# Patient Record
Sex: MALE | Race: OTHER | HISPANIC OR LATINO | URBAN - METROPOLITAN AREA
[De-identification: names, ages, dates, MRNs, and addresses within clinical notes are randomized per-mention and may not be internally consistent; named-entity substitution may affect disease eponyms.]

---

## 2018-05-04 ENCOUNTER — EMERGENCY (EMERGENCY)
Facility: HOSPITAL | Age: 38
LOS: 1 days | Discharge: HOME | End: 2018-05-04
Attending: EMERGENCY MEDICINE
Payer: MEDICAID

## 2018-05-04 VITALS
TEMPERATURE: 98 F | SYSTOLIC BLOOD PRESSURE: 132 MMHG | WEIGHT: 184.97 LBS | HEIGHT: 65 IN | RESPIRATION RATE: 18 BRPM | HEART RATE: 85 BPM | OXYGEN SATURATION: 99 % | DIASTOLIC BLOOD PRESSURE: 82 MMHG

## 2018-05-04 VITALS
RESPIRATION RATE: 19 BRPM | OXYGEN SATURATION: 98 % | DIASTOLIC BLOOD PRESSURE: 76 MMHG | SYSTOLIC BLOOD PRESSURE: 120 MMHG | TEMPERATURE: 98 F | HEART RATE: 79 BPM

## 2018-05-04 PROCEDURE — 71046 X-RAY EXAM CHEST 2 VIEWS: CPT | Mod: 26

## 2018-05-04 PROCEDURE — 93005 ELECTROCARDIOGRAM TRACING: CPT

## 2018-05-04 PROCEDURE — 99283 EMERGENCY DEPT VISIT LOW MDM: CPT | Mod: 25

## 2018-05-04 PROCEDURE — 99053 MED SERV 10PM-8AM 24 HR FAC: CPT

## 2018-05-04 PROCEDURE — 71046 X-RAY EXAM CHEST 2 VIEWS: CPT

## 2018-05-04 PROCEDURE — 93010 ELECTROCARDIOGRAM REPORT: CPT

## 2018-05-04 PROCEDURE — 99283 EMERGENCY DEPT VISIT LOW MDM: CPT

## 2018-05-04 RX ORDER — IBUPROFEN 200 MG
600 TABLET ORAL ONCE
Qty: 0 | Refills: 0 | Status: COMPLETED | OUTPATIENT
Start: 2018-05-04 | End: 2018-05-04

## 2018-05-04 RX ADMIN — Medication 600 MILLIGRAM(S): at 03:01
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
## 2018-05-05 NOTE — ED PROVIDER NOTE - PHYSICAL EXAMINATION
General: well appearing, in no acute distressed, alert and oriented to person, place and time  HEENT: Head: atraumatic Eyes: pupils round and equal, reactive to light, extraocular ROM intact, Ears: atraumatic, Neck: supple and atraumatic Throat: patent, no swelling, no blood, uvula midline  Cardiac: regular rate and rhythm, S1 and S2 heard, no murmurs, distal pulses intact  Respiratory: breathing is even and unlabored, lung sounds clear to auscultation bilaterally, no wheezes, rales or rhonchi   Gastrointestinal: abdomen soft, non-tender, bowel sounds present  Musculoskeletal: no deformities, + full ROM of all joints, + anterior chest wall tenderness, no tenderness of cervical spine, no midline back tenderness, no tenderness of hips, pelvis or extremities, patient is ambulating without difficulty  Neurological: cranial nerves II through XII intact, no focal weaknesses, 5/5 motor strength in all extremities, sensation intact throughout  Skin: intact; no lacerations, abrasions, swelling, hematomas, or ecchymosis; no jo sign, negative seatbelt sign, capillary refill < 2 sec in all digits  Psychiatric; no suicidal or homicidal ideation
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
## 2020-04-04 ENCOUNTER — EMERGENCY (EMERGENCY)
Facility: HOSPITAL | Age: 40
LOS: 1 days | Discharge: HOME | End: 2020-04-04
Attending: EMERGENCY MEDICINE
Payer: MEDICAID

## 2020-04-04 VITALS
SYSTOLIC BLOOD PRESSURE: 115 MMHG | WEIGHT: 179.9 LBS | HEART RATE: 104 BPM | OXYGEN SATURATION: 98 % | DIASTOLIC BLOOD PRESSURE: 65 MMHG | HEIGHT: 65 IN | RESPIRATION RATE: 26 BRPM | TEMPERATURE: 103 F

## 2020-04-04 LAB
ALBUMIN SERPL ELPH-MCNC: 3.9 G/DL — SIGNIFICANT CHANGE UP (ref 3.3–5.2)
ALP SERPL-CCNC: 79 U/L — SIGNIFICANT CHANGE UP (ref 40–120)
ALT FLD-CCNC: 36 U/L — SIGNIFICANT CHANGE UP
ANION GAP SERPL CALC-SCNC: 18 MMOL/L — HIGH (ref 5–17)
AST SERPL-CCNC: 57 U/L — HIGH
BASOPHILS # BLD AUTO: 0.01 K/UL — SIGNIFICANT CHANGE UP (ref 0–0.2)
BASOPHILS NFR BLD AUTO: 0.1 % — SIGNIFICANT CHANGE UP (ref 0–2)
BILIRUB SERPL-MCNC: 0.4 MG/DL — SIGNIFICANT CHANGE UP (ref 0.4–2)
BUN SERPL-MCNC: 9 MG/DL — SIGNIFICANT CHANGE UP (ref 8–20)
CALCIUM SERPL-MCNC: 8.8 MG/DL — SIGNIFICANT CHANGE UP (ref 8.6–10.2)
CHLORIDE SERPL-SCNC: 101 MMOL/L — SIGNIFICANT CHANGE UP (ref 98–107)
CO2 SERPL-SCNC: 20 MMOL/L — LOW (ref 22–29)
CREAT SERPL-MCNC: 0.98 MG/DL — SIGNIFICANT CHANGE UP (ref 0.5–1.3)
EOSINOPHIL # BLD AUTO: 0.01 K/UL — SIGNIFICANT CHANGE UP (ref 0–0.5)
EOSINOPHIL NFR BLD AUTO: 0.1 % — SIGNIFICANT CHANGE UP (ref 0–6)
GLUCOSE SERPL-MCNC: 109 MG/DL — HIGH (ref 70–99)
HCT VFR BLD CALC: 42.2 % — SIGNIFICANT CHANGE UP (ref 39–50)
HGB BLD-MCNC: 14.2 G/DL — SIGNIFICANT CHANGE UP (ref 13–17)
IMM GRANULOCYTES NFR BLD AUTO: 0.4 % — SIGNIFICANT CHANGE UP (ref 0–1.5)
LYMPHOCYTES # BLD AUTO: 1.26 K/UL — SIGNIFICANT CHANGE UP (ref 1–3.3)
LYMPHOCYTES # BLD AUTO: 13 % — SIGNIFICANT CHANGE UP (ref 13–44)
MCHC RBC-ENTMCNC: 30.4 PG — SIGNIFICANT CHANGE UP (ref 27–34)
MCHC RBC-ENTMCNC: 33.6 GM/DL — SIGNIFICANT CHANGE UP (ref 32–36)
MCV RBC AUTO: 90.4 FL — SIGNIFICANT CHANGE UP (ref 80–100)
MONOCYTES # BLD AUTO: 0.3 K/UL — SIGNIFICANT CHANGE UP (ref 0–0.9)
MONOCYTES NFR BLD AUTO: 3.1 % — SIGNIFICANT CHANGE UP (ref 2–14)
NEUTROPHILS # BLD AUTO: 8.04 K/UL — HIGH (ref 1.8–7.4)
NEUTROPHILS NFR BLD AUTO: 83.3 % — HIGH (ref 43–77)
PLATELET # BLD AUTO: 298 K/UL — SIGNIFICANT CHANGE UP (ref 150–400)
POTASSIUM SERPL-MCNC: 3.7 MMOL/L — SIGNIFICANT CHANGE UP (ref 3.5–5.3)
POTASSIUM SERPL-SCNC: 3.7 MMOL/L — SIGNIFICANT CHANGE UP (ref 3.5–5.3)
PROT SERPL-MCNC: 7.3 G/DL — SIGNIFICANT CHANGE UP (ref 6.6–8.7)
RBC # BLD: 4.67 M/UL — SIGNIFICANT CHANGE UP (ref 4.2–5.8)
RBC # FLD: 12 % — SIGNIFICANT CHANGE UP (ref 10.3–14.5)
SODIUM SERPL-SCNC: 139 MMOL/L — SIGNIFICANT CHANGE UP (ref 135–145)
WBC # BLD: 9.66 K/UL — SIGNIFICANT CHANGE UP (ref 3.8–10.5)
WBC # FLD AUTO: 9.66 K/UL — SIGNIFICANT CHANGE UP (ref 3.8–10.5)

## 2020-04-04 PROCEDURE — 71045 X-RAY EXAM CHEST 1 VIEW: CPT

## 2020-04-04 PROCEDURE — 94640 AIRWAY INHALATION TREATMENT: CPT

## 2020-04-04 PROCEDURE — 36415 COLL VENOUS BLD VENIPUNCTURE: CPT

## 2020-04-04 PROCEDURE — 85027 COMPLETE CBC AUTOMATED: CPT

## 2020-04-04 PROCEDURE — 93010 ELECTROCARDIOGRAM REPORT: CPT

## 2020-04-04 PROCEDURE — 99285 EMERGENCY DEPT VISIT HI MDM: CPT

## 2020-04-04 PROCEDURE — 93005 ELECTROCARDIOGRAM TRACING: CPT

## 2020-04-04 PROCEDURE — 84484 ASSAY OF TROPONIN QUANT: CPT

## 2020-04-04 PROCEDURE — 80053 COMPREHEN METABOLIC PANEL: CPT

## 2020-04-04 PROCEDURE — T1013: CPT

## 2020-04-04 PROCEDURE — 99283 EMERGENCY DEPT VISIT LOW MDM: CPT | Mod: 25

## 2020-04-04 RX ORDER — ALBUTEROL 90 UG/1
4 AEROSOL, METERED ORAL ONCE
Refills: 0 | Status: COMPLETED | OUTPATIENT
Start: 2020-04-04 | End: 2020-04-04

## 2020-04-04 RX ORDER — ACETAMINOPHEN 500 MG
975 TABLET ORAL ONCE
Refills: 0 | Status: COMPLETED | OUTPATIENT
Start: 2020-04-04 | End: 2020-04-04

## 2020-04-04 RX ADMIN — Medication 200 MILLIGRAM(S): at 23:27

## 2020-04-04 RX ADMIN — Medication 100 MILLIGRAM(S): at 23:26

## 2020-04-04 RX ADMIN — Medication 975 MILLIGRAM(S): at 23:26

## 2020-04-04 RX ADMIN — ALBUTEROL 4 PUFF(S): 90 AEROSOL, METERED ORAL at 23:27

## 2020-04-04 NOTE — ED PROVIDER NOTE - ATTENDING CONTRIBUTION TO CARE
I, Flavio Castillo, performed a face to face bedside interview with this patient regarding history of present illness, review of symptoms and relevant past medical, social and family history.  I completed an independent physical examination. I have communicated the patient’s plan of care and disposition with the ACP.  39 year old presents with several days of cough, congestion, fever. Known covid + with b/l infiltrates 2 days ago at [facility], preports perisstent Sx  Gen: NAD, well appearing  CV: RRR  Pul: CTA b/l  Abd: Soft, non-distended, non-tender  Neuro: no focal deficits  Pt is well appearing, lungs clear on exam, no increased resp effort, no hypoxia. Sx consistent with likely covid. Advised self quarantine, and gave strict return precautions

## 2020-04-04 NOTE — ED PROVIDER NOTE - OBJECTIVE STATEMENT
40 y/o M with PMH kidney stones presents to ED with c/o cough, fever, chills. Also with chest pain worse with coughing. Was seen at [facility] 2 days ago for same, tested positive for COVID. Pt had CXR there which showed bilateral patchy infiltrates and a negative CT of the abdomen.  States he was having diarrhea a few days ago now resolved, reports abdominal pain "in the muscles" with coughing. Pt noted to be febrile, did not take tylenol prior to coming to ED.

## 2020-04-04 NOTE — ED PROVIDER NOTE - PATIENT PORTAL LINK FT
You can access the FollowMyHealth Patient Portal offered by [facility] by registering at the following website: [email or web address]. By joining [employer]’s FollowMyHealth portal, you will also be able to view your health information using other applications (apps) compatible with our system.

## 2020-04-04 NOTE — ED PROVIDER NOTE - CLINICAL SUMMARY MEDICAL DECISION MAKING FREE TEXT BOX
38 y/o M with cough, fever, chills, likely COVID , bilateral patchy infiltrates on CXR from other hospital 2 days ago, abd soft and nontender with negative CT from other hospital 2 days ago, plan= labs, will repeat CXR, fluids, tylenol, MDI, and cough meds, will reassess, not hypoxic in ED

## 2020-04-04 NOTE — ED PROVIDER NOTE - NSFOLLOWUPINSTRUCTIONS_ED_ALL_ED_FT
rest, hydrate well with water, Gatorade, protein shakes, smoothies, etc.    For fevers , chills, and/or aches/pains, you can take Tylenol 650mg-975mg (no more than 15 mg / kg) every 4-6 hours as needed.  Do not take more than 1000mg of Tylenol at a time, and do not take more than 4000mg of Tylenol daily.     Additionally, you may take Ibuprofen/Motrin/Advil 600mg every 6-8 hours as needed.  To date, there is not strong evidence to support not using NSAIDS in the treatment of fever in patients with suspected COVID-19.  Please reference the CDC or FDA websites (listed below) for updated information.    If provided or prescribed an Albuterol inhaler 2 puffs, up to six puffs every 4-6 hours as needed for shortness of breath.     The Coronavirus Hotline for Testing by Appointment is: [phone] or [phone]    COVID-19    COVID-19, also known as coronavirus disease or novel coronavirus, is caused by a type of virus that causes respiratory illness. This may lead to inflammation and the buildup of mucus and fluids in the airway of the lungs (pneumonia). There are many different coronaviruses. Most of these viruses only affect animals, but sometimes these viruses can change and infect people.    What are the causes?    This illness is caused by a virus. You may catch the virus by:    Breathing in droplets from an infected person's cough or sneeze.Touching something, like a table or a doorknob, that was exposed to the virus (contaminated) and then touching your mouth, nose, or eyes.Being around animals that carry the virus, or eating uncooked or undercooked meat or animal products that contain the virus.    What increases the risk?    You are more likely to develop this condition if you:    Live in or travel to an area with a COVID-19 outbreak.Come in contact with a sick person who recently traveled to an area with a COVID-19 outbreak.Provide care for or live with a person who is infected with   COVID-19.What are the signs or symptoms?    COVID-19 causes respiratory illness that can lead to pneumonia. Symptoms of pneumonia may include:  A fever.A cough.Difficulty breathing.How is this diagnosed?  This condition may be diagnosed based on:    Your signs and symptoms, especially if:  You live in an area with a COVID-19 outbreak.You recently traveled to or from an area where the virus is common.You provide care for or live with a person who was diagnosed with COVID-19.A physical exam.    Lab tests, which may include:    A nasal swab to take a sample of fluid from your nose.A throat swab to take a sample of fluid from your throat.A sample of mucus from your lungs (sputum).Blood tests.How is this treated?    There is no medicine to treat COVID-19. Your health care provider will talk with you about ways to treat your symptoms. This may include rest, fluids, and over-the-counter medicines.  Follow these instructions at home:    Lifestyle     Use a cool-mist humidifier to add moisture to the air. This can help you breathe more easily.Do not use any products that contain nicotine or tobacco, such as cigarettes, e-cigarettes, and chewing tobacco. If you need help quitting, ask your health care provider.Rest at home as told by your health care provider.Return to your normal activities as told by your health care provider. Ask your health care provider what activities are safe for you.    General instructions     Take over-the-counter and prescription medicines only as told by your health care provider.Drink enough fluid to keep your urine pale yellow.Keep all follow-up visits as told by your health care provider. This is important.How is this prevented?     There is no vaccine to help prevent COVID-19 infection. However, there are steps you can take to protect yourself and others from this virus.    To protect yourself:     Do not travel to areas where COVID-19 is a risk. The areas where COVID-19 is reported change often. To identify high-risk areas, check the CDC travel website: wwwnc.cdc.gov/travel/noticesIf you live in, or must travel to, an area where COVID-19 is a risk, take precautions to avoid infection.    Stay away from people who are sick.Stay away from places where there are animals that may carry the virus. This includes places where animals and animal products are sold. Note that both living and dead animals can carry the virus.Wash your hands often with soap and water. If soap and water are not available, use an alcohol-based hand .Avoid touching your mouth, face, eyes, or nose.    To protect others:     If you have symptoms, take steps to prevent the virus from spreading to others.    If you think you have a COVID-19 infection, contact your health care provider right away. Tell your health care team that you think you may have a COVID-19 infection.  Stay home.  Leave your house only to seek medical care.  Do not travel while you are sick.  Wash your hands often with soap and water.  If soap and water are not available, use alcohol-based hand .  Stay away from other members of your household.   If possible, stay in your own room, separate from others.   Use a different bathroom.  Make sure that all people in your household wash their hands well and often.  Cough or sneeze into a tissue or your sleeve or elbow.   Do not cough or sneeze into your hand or into the air.  Wear a face mask.Where to find more information  Centers for Disease Control and Prevention: www.cdc.gov/coronavirus/2019-ncov/index.html  World Health Organization: www.who.int/health-topics/coronavirus  Contact a health care provider if:  You have traveled to an area where COVID-19 is a risk and you have symptoms of the infection.  You have contact with someone who has traveled to an area where COVID-19 is a risk and you have symptoms of the infection.Get help right away if:    You have trouble breathing.You have chest pain.    Summary    COVID-19 is caused by a type of virus that causes respiratory illness.  This may lead to inflammation and the buildup of mucus and fluids in the airway of the lungs (pneumonia).  You are more likely to develop this condition if you live in or travel to an area with a COVID-19 outbreak.  There is no medicine to treat COVID-19.   Your health care provider will talk with you about ways to treat your symptoms.  Take steps to protect yourself and others from infection. Wash your hands often.   Stay away from other people who are sick and wear a mask if you are sick.  This information is not intended to replace advice given to you by your health care provider.   Make sure you discuss any questions you have with your health care provider.

## 2020-04-05 VITALS
DIASTOLIC BLOOD PRESSURE: 64 MMHG | OXYGEN SATURATION: 99 % | SYSTOLIC BLOOD PRESSURE: 111 MMHG | TEMPERATURE: 99 F | RESPIRATION RATE: 22 BRPM | HEART RATE: 89 BPM

## 2020-04-05 LAB — TROPONIN T SERPL-MCNC: <0.01 NG/ML — SIGNIFICANT CHANGE UP (ref 0–0.06)

## 2020-04-05 PROCEDURE — 71045 X-RAY EXAM CHEST 1 VIEW: CPT | Mod: 26

## 2020-04-05 RX ORDER — ALBUTEROL 90 UG/1
2 AEROSOL, METERED ORAL
Qty: 1 | Refills: 0
Start: 2020-04-05 | End: 2020-05-04

## 2020-11-18 ENCOUNTER — EMERGENCY (EMERGENCY)
Facility: HOSPITAL | Age: 40
LOS: 1 days | Discharge: HOME | End: 2020-11-18
Attending: EMERGENCY MEDICINE
Payer: MEDICAID

## 2020-11-18 ENCOUNTER — EMERGENCY (EMERGENCY)
Facility: HOSPITAL | Age: 40
LOS: 1 days | Discharge: LEFT BEFORE DISCHARGE | End: 2020-11-18
Attending: STUDENT IN AN ORGANIZED HEALTH CARE EDUCATION/TRAINING PROGRAM
Payer: MEDICAID

## 2020-11-18 VITALS
DIASTOLIC BLOOD PRESSURE: 85 MMHG | HEIGHT: 65 IN | SYSTOLIC BLOOD PRESSURE: 123 MMHG | HEART RATE: 80 BPM | WEIGHT: 190.04 LBS | TEMPERATURE: 99 F | OXYGEN SATURATION: 97 % | RESPIRATION RATE: 18 BRPM

## 2020-11-18 VITALS
OXYGEN SATURATION: 97 % | TEMPERATURE: 98 F | HEART RATE: 90 BPM | DIASTOLIC BLOOD PRESSURE: 83 MMHG | SYSTOLIC BLOOD PRESSURE: 136 MMHG | RESPIRATION RATE: 16 BRPM | WEIGHT: 160.06 LBS | HEIGHT: 65 IN

## 2020-11-18 VITALS
RESPIRATION RATE: 18 BRPM | HEART RATE: 85 BPM | OXYGEN SATURATION: 97 % | WEIGHT: 199.96 LBS | SYSTOLIC BLOOD PRESSURE: 133 MMHG | TEMPERATURE: 98 F | DIASTOLIC BLOOD PRESSURE: 86 MMHG | HEIGHT: 65 IN

## 2020-11-18 LAB
ALBUMIN SERPL ELPH-MCNC: 4.4 G/DL — SIGNIFICANT CHANGE UP (ref 3.3–5.2)
ALBUMIN SERPL ELPH-MCNC: 4.4 G/DL — SIGNIFICANT CHANGE UP (ref 3.3–5.2)
ALP SERPL-CCNC: 75 U/L — SIGNIFICANT CHANGE UP (ref 40–120)
ALP SERPL-CCNC: 78 U/L — SIGNIFICANT CHANGE UP (ref 40–120)
ALT FLD-CCNC: 39 U/L — SIGNIFICANT CHANGE UP
ALT FLD-CCNC: 39 U/L — SIGNIFICANT CHANGE UP
ANION GAP SERPL CALC-SCNC: 12 MMOL/L — SIGNIFICANT CHANGE UP (ref 5–17)
ANION GAP SERPL CALC-SCNC: 13 MMOL/L — SIGNIFICANT CHANGE UP (ref 5–17)
APTT BLD: 26.2 SEC — LOW (ref 27.5–35.5)
AST SERPL-CCNC: 33 U/L — SIGNIFICANT CHANGE UP
AST SERPL-CCNC: 35 U/L — SIGNIFICANT CHANGE UP
BASOPHILS # BLD AUTO: 0.03 K/UL — SIGNIFICANT CHANGE UP (ref 0–0.2)
BASOPHILS # BLD AUTO: 0.03 K/UL — SIGNIFICANT CHANGE UP (ref 0–0.2)
BASOPHILS NFR BLD AUTO: 0.2 % — SIGNIFICANT CHANGE UP (ref 0–2)
BASOPHILS NFR BLD AUTO: 0.4 % — SIGNIFICANT CHANGE UP (ref 0–2)
BILIRUB SERPL-MCNC: 0.4 MG/DL — SIGNIFICANT CHANGE UP (ref 0.4–2)
BILIRUB SERPL-MCNC: 0.5 MG/DL — SIGNIFICANT CHANGE UP (ref 0.4–2)
BUN SERPL-MCNC: 13 MG/DL — SIGNIFICANT CHANGE UP (ref 8–20)
BUN SERPL-MCNC: 18 MG/DL — SIGNIFICANT CHANGE UP (ref 8–20)
CALCIUM SERPL-MCNC: 9.1 MG/DL — SIGNIFICANT CHANGE UP (ref 8.6–10.2)
CALCIUM SERPL-MCNC: 9.3 MG/DL — SIGNIFICANT CHANGE UP (ref 8.6–10.2)
CHLORIDE SERPL-SCNC: 103 MMOL/L — SIGNIFICANT CHANGE UP (ref 98–107)
CHLORIDE SERPL-SCNC: 104 MMOL/L — SIGNIFICANT CHANGE UP (ref 98–107)
CO2 SERPL-SCNC: 23 MMOL/L — SIGNIFICANT CHANGE UP (ref 22–29)
CO2 SERPL-SCNC: 24 MMOL/L — SIGNIFICANT CHANGE UP (ref 22–29)
CREAT SERPL-MCNC: 0.85 MG/DL — SIGNIFICANT CHANGE UP (ref 0.5–1.3)
CREAT SERPL-MCNC: 0.86 MG/DL — SIGNIFICANT CHANGE UP (ref 0.5–1.3)
D DIMER BLD IA.RAPID-MCNC: <150 NG/ML DDU — SIGNIFICANT CHANGE UP
EOSINOPHIL # BLD AUTO: 0.17 K/UL — SIGNIFICANT CHANGE UP (ref 0–0.5)
EOSINOPHIL # BLD AUTO: 0.19 K/UL — SIGNIFICANT CHANGE UP (ref 0–0.5)
EOSINOPHIL NFR BLD AUTO: 1.4 % — SIGNIFICANT CHANGE UP (ref 0–6)
EOSINOPHIL NFR BLD AUTO: 2.7 % — SIGNIFICANT CHANGE UP (ref 0–6)
GLUCOSE SERPL-MCNC: 125 MG/DL — HIGH (ref 70–99)
GLUCOSE SERPL-MCNC: 97 MG/DL — SIGNIFICANT CHANGE UP (ref 70–99)
HCT VFR BLD CALC: 44.2 % — SIGNIFICANT CHANGE UP (ref 39–50)
HCT VFR BLD CALC: 46.3 % — SIGNIFICANT CHANGE UP (ref 39–50)
HGB BLD-MCNC: 15.1 G/DL — SIGNIFICANT CHANGE UP (ref 13–17)
HGB BLD-MCNC: 15.8 G/DL — SIGNIFICANT CHANGE UP (ref 13–17)
IMM GRANULOCYTES NFR BLD AUTO: 0.3 % — SIGNIFICANT CHANGE UP (ref 0–1.5)
IMM GRANULOCYTES NFR BLD AUTO: 0.3 % — SIGNIFICANT CHANGE UP (ref 0–1.5)
INR BLD: 0.98 RATIO — SIGNIFICANT CHANGE UP (ref 0.88–1.16)
LIDOCAIN IGE QN: 29 U/L — SIGNIFICANT CHANGE UP (ref 22–51)
LYMPHOCYTES # BLD AUTO: 17.1 % — SIGNIFICANT CHANGE UP (ref 13–44)
LYMPHOCYTES # BLD AUTO: 2.14 K/UL — SIGNIFICANT CHANGE UP (ref 1–3.3)
LYMPHOCYTES # BLD AUTO: 2.59 K/UL — SIGNIFICANT CHANGE UP (ref 1–3.3)
LYMPHOCYTES # BLD AUTO: 37.1 % — SIGNIFICANT CHANGE UP (ref 13–44)
MAGNESIUM SERPL-MCNC: 2 MG/DL — SIGNIFICANT CHANGE UP (ref 1.8–2.6)
MAGNESIUM SERPL-MCNC: 2.2 MG/DL — SIGNIFICANT CHANGE UP (ref 1.6–2.6)
MCHC RBC-ENTMCNC: 30.9 PG — SIGNIFICANT CHANGE UP (ref 27–34)
MCHC RBC-ENTMCNC: 31 PG — SIGNIFICANT CHANGE UP (ref 27–34)
MCHC RBC-ENTMCNC: 34.1 GM/DL — SIGNIFICANT CHANGE UP (ref 32–36)
MCHC RBC-ENTMCNC: 34.2 GM/DL — SIGNIFICANT CHANGE UP (ref 32–36)
MCV RBC AUTO: 90.6 FL — SIGNIFICANT CHANGE UP (ref 80–100)
MCV RBC AUTO: 91 FL — SIGNIFICANT CHANGE UP (ref 80–100)
MONOCYTES # BLD AUTO: 0.44 K/UL — SIGNIFICANT CHANGE UP (ref 0–0.9)
MONOCYTES # BLD AUTO: 0.44 K/UL — SIGNIFICANT CHANGE UP (ref 0–0.9)
MONOCYTES NFR BLD AUTO: 3.5 % — SIGNIFICANT CHANGE UP (ref 2–14)
MONOCYTES NFR BLD AUTO: 6.3 % — SIGNIFICANT CHANGE UP (ref 2–14)
NEUTROPHILS # BLD AUTO: 3.71 K/UL — SIGNIFICANT CHANGE UP (ref 1.8–7.4)
NEUTROPHILS # BLD AUTO: 9.69 K/UL — HIGH (ref 1.8–7.4)
NEUTROPHILS NFR BLD AUTO: 53.2 % — SIGNIFICANT CHANGE UP (ref 43–77)
NEUTROPHILS NFR BLD AUTO: 77.5 % — HIGH (ref 43–77)
NT-PROBNP SERPL-SCNC: 8 PG/ML — SIGNIFICANT CHANGE UP (ref 0–300)
PLATELET # BLD AUTO: 293 K/UL — SIGNIFICANT CHANGE UP (ref 150–400)
PLATELET # BLD AUTO: 325 K/UL — SIGNIFICANT CHANGE UP (ref 150–400)
POTASSIUM SERPL-MCNC: 3.6 MMOL/L — SIGNIFICANT CHANGE UP (ref 3.5–5.3)
POTASSIUM SERPL-MCNC: 3.7 MMOL/L — SIGNIFICANT CHANGE UP (ref 3.5–5.3)
POTASSIUM SERPL-SCNC: 3.6 MMOL/L — SIGNIFICANT CHANGE UP (ref 3.5–5.3)
POTASSIUM SERPL-SCNC: 3.7 MMOL/L — SIGNIFICANT CHANGE UP (ref 3.5–5.3)
PROT SERPL-MCNC: 7.3 G/DL — SIGNIFICANT CHANGE UP (ref 6.6–8.7)
PROT SERPL-MCNC: 7.5 G/DL — SIGNIFICANT CHANGE UP (ref 6.6–8.7)
PROTHROM AB SERPL-ACNC: 11.4 SEC — SIGNIFICANT CHANGE UP (ref 10.6–13.6)
RBC # BLD: 4.88 M/UL — SIGNIFICANT CHANGE UP (ref 4.2–5.8)
RBC # BLD: 5.09 M/UL — SIGNIFICANT CHANGE UP (ref 4.2–5.8)
RBC # FLD: 12.4 % — SIGNIFICANT CHANGE UP (ref 10.3–14.5)
RBC # FLD: 12.5 % — SIGNIFICANT CHANGE UP (ref 10.3–14.5)
SODIUM SERPL-SCNC: 139 MMOL/L — SIGNIFICANT CHANGE UP (ref 135–145)
SODIUM SERPL-SCNC: 140 MMOL/L — SIGNIFICANT CHANGE UP (ref 135–145)
TROPONIN T SERPL-MCNC: <0.01 NG/ML — SIGNIFICANT CHANGE UP (ref 0–0.06)
TROPONIN T SERPL-MCNC: <0.01 NG/ML — SIGNIFICANT CHANGE UP (ref 0–0.06)
WBC # BLD: 12.51 K/UL — HIGH (ref 3.8–10.5)
WBC # BLD: 6.98 K/UL — SIGNIFICANT CHANGE UP (ref 3.8–10.5)
WBC # FLD AUTO: 12.51 K/UL — HIGH (ref 3.8–10.5)
WBC # FLD AUTO: 6.98 K/UL — SIGNIFICANT CHANGE UP (ref 3.8–10.5)

## 2020-11-18 PROCEDURE — 84484 ASSAY OF TROPONIN QUANT: CPT

## 2020-11-18 PROCEDURE — 93010 ELECTROCARDIOGRAM REPORT: CPT | Mod: 76

## 2020-11-18 PROCEDURE — 99284 EMERGENCY DEPT VISIT MOD MDM: CPT

## 2020-11-18 PROCEDURE — 71045 X-RAY EXAM CHEST 1 VIEW: CPT | Mod: 26,59

## 2020-11-18 PROCEDURE — 93010 ELECTROCARDIOGRAM REPORT: CPT

## 2020-11-18 PROCEDURE — 83735 ASSAY OF MAGNESIUM: CPT

## 2020-11-18 PROCEDURE — 99284 EMERGENCY DEPT VISIT MOD MDM: CPT | Mod: 25

## 2020-11-18 PROCEDURE — 99285 EMERGENCY DEPT VISIT HI MDM: CPT

## 2020-11-18 PROCEDURE — 93005 ELECTROCARDIOGRAM TRACING: CPT

## 2020-11-18 PROCEDURE — 85025 COMPLETE CBC W/AUTO DIFF WBC: CPT

## 2020-11-18 PROCEDURE — 71046 X-RAY EXAM CHEST 2 VIEWS: CPT

## 2020-11-18 PROCEDURE — 96374 THER/PROPH/DIAG INJ IV PUSH: CPT

## 2020-11-18 PROCEDURE — 36415 COLL VENOUS BLD VENIPUNCTURE: CPT

## 2020-11-18 PROCEDURE — 71046 X-RAY EXAM CHEST 2 VIEWS: CPT | Mod: 26

## 2020-11-18 PROCEDURE — 80053 COMPREHEN METABOLIC PANEL: CPT

## 2020-11-18 RX ORDER — KETOROLAC TROMETHAMINE 30 MG/ML
15 SYRINGE (ML) INJECTION ONCE
Refills: 0 | Status: DISCONTINUED | OUTPATIENT
Start: 2020-11-18 | End: 2020-11-18

## 2020-11-18 RX ADMIN — Medication 15 MILLIGRAM(S): at 08:05

## 2020-11-18 NOTE — ED PROVIDER NOTE - CARDIAC, MLM
Normal rate, regular rhythm.  Heart sounds S1, S2.  No murmurs, rubs or gallops. no pretibial edema no calf tenderness

## 2020-11-18 NOTE — ED PROVIDER NOTE - PROGRESS NOTE DETAILS
The pt is clinically sober, AA&Ox3, free from distracting injury.  Throughout our interactions in the ED today, the pt has demonstrated concrete thinking/reasoning, has maintained an orderly/reasonable conversation, appears to have intact insight/judgment/reason and therefore in our opinion has capacity to make decisions.  Given the pt’s presentation, we communicated our concern for MI/PE in laymans terms.    The pt verbalized an understanding of our worries. We’ve told the patient that the ED evaluation is incomplete & many troublesome conditions haven’t been r/o. We have discussed the need for further ED w/u so we can get more information about his chest pain.  We have discussed the range of possible dx, potential testing & tx options.  We’ve made  numerous efforts to prevent the pt from leaving AMA.  Our discussions included the potential outcomes of leaving AMA, including worsening of their condition, becoming permanently disabled/in pain/critically ill, or death.  Despite these efforts, we were unable to convince the pt to stay.  The pt is refusing any  further care and is leaving against medical advice. We have attempted to offer tx/rx/guidance for any dangerous conditions which are most likely and/or dangerous.  We have answered all questions and have implored the pt to return ASAP to complete the w/u.  A staff member witnessed the patient consenting to AMA.

## 2020-11-18 NOTE — ED PROVIDER NOTE - PATIENT PORTAL LINK FT
You can access the FollowMyHealth Patient Portal offered by [facility] by registering at the following website: [email or web address]. By joining [employer]’s FollowMyHealth portal, you will also be able to view your health information using other applications (apps) compatible with our system.

## 2020-11-18 NOTE — ED PROVIDER NOTE - HIV OFFER
Recommendations from today's MTM visit:                                                    1. Continue current medications and follow-up as planned.     2. Please reach out if you have questions.    Next MTM visit: as needed, based on patient preference    To schedule another MTM appointment, please call the clinic directly or you may call the MTM scheduling line at [phone] or toll-free at [phone].     My Clinical Pharmacist's contact information:                                                      It was a pleasure speaking with you you today!  Please feel free to contact me with any questions or concerns you have.      Vee Pritchett PharmD   MTM Pharmacist   Adult Rheumatology and IBD  Phone: [phone]    You may receive a survey about the MTM services you received.  I would appreciate your feedback to help me serve you better in the future. Please fill it out and return it when you can. Your comments will be anonymous.           Previously Declined (within the last year)

## 2020-11-18 NOTE — ED PROVIDER NOTE - CLINICAL SUMMARY MEDICAL DECISION MAKING FREE TEXT BOX
41y/o M with PMHx of Anxiety presents to the ED c/o non radiating CP which began 1 night ago. Assoc. sx of SOB. 39y/o M with PMHx of Anxiety presents to the ED c/o non radiating CP which began 1 night ago. Assoc. sx of SOB. Pt came back after making arrangements at home for his kids, ekg wnl, trop neg, sent in for cardiac ct, echo and acs eval, keep in obs

## 2020-11-18 NOTE — ED ADULT NURSE NOTE - OBJECTIVE STATEMENT
Pt A&Ox4.  Pt was discharged today with same issues, as per pt he had to leave to take his son to the [facility].  Pt came back complaining of 5out 10 chest pain.  As per pt he has a hx of panic attacks and has been off medication x2weeks. Pt stated that he is also claustrophobic.  No SOB noted. Cardiac monitor in place.  Will continue to monitor.

## 2020-11-18 NOTE — ED PROVIDER NOTE - ATTENDING CONTRIBUTION TO CARE
40yoM; with pmh signif for Anxiety, prior COVID infection in April; now p/w chest pain--left sided, non-radiating, non-exertional, heaviness, associated with sob, palpitations, lightheadedness, diaphoresis, which he has associated with anxiety in the past and takes drives to get cold air which resolved pain.  states with chest pain, his BP was in 170s/90s.  denies f/c/s. c/o chronic cough.  denies travel/trauma/smoking.    General:     NAD, well-nourished, well-appearing  Head:     NC/AT, EOMI, oral mucosa moist  Neck:     trachea midline  Lungs:     CTA b/l, no w/r/r  CVS:     S1S2, RRR, no m/g/r  Abd:     +BS, s/nt/nd, no organomegaly  Ext:    2+ radial and pedal pulses, no c/c/e  Neuro: AAOx3, no sensory/motor deficits  A/P:  40yoM p/w chest pain  -labs, ekg, [facility] consult

## 2020-11-18 NOTE — ED PROVIDER NOTE - NSFOLLOWUPINSTRUCTIONS_ED_ALL_ED_FT
Dolor de pecho inespecífico      El dolor de pecho puede deberse a muchas afecciones diferentes. Siempre existe chris posibilidad de que el dolor esté relacionado con algo grave, martinez un infarto de miocardio o un coágulo sanguíneo en los pulmones. Hay diferentes afecciones que no son potencialmente mortales que pueden causar dolor de pecho. Si tiene dolor de pecho, es muy importante que se controle con el médico.     ¿Cuáles son las causas?  Entre las causas de esta afección se incluyen las siguientes:    Acidez estomacal.  Neumonía o bronquitis.  Ansiedad o estrés.  Inflamación de la asif que rodea el corazón (pericarditis) o los pulmones (pleuritis o pleuresía).  Un coágulo sanguíneo en el pulmón.  Pulmón colapsado (neumotórax). Puede aparecer de manera repentina por sí solo (neumotórax espontáneo) o debido a un traumatismo en el tórax.  Culebrilla (virus de la varicela zóster).  Infarto de miocardio.  Daño de los huesos, los músculos y los cartílagos que conforman la pared torácica. [place] puede incluir lo siguiente:  Hematomas óseos debido a lesiones.  Distensiones musculares o de los cartílagos por tos frecuente o repetida, o por exceso de trabajo.  Fractura de chris o más costillas.  Dolor de cartílago debido a inflamación (costocondritis).    ¿Qué incrementa el riesgo?  Entre los factores de riesgo de esta afección se pueden incluir los siguientes:    Actividades que incrementan el riesgo de sufrir traumatismos o lesiones en el tórax.  Infecciones o enfermedades respiratorias que causan tos frecuente.  Afecciones o excesos en las comidas que pueden causar acidez estomacal.  Cardiopatías coronarias o antecedentes familiares de enfermedades cardíacas.  Afecciones o comportamientos de reji que aumentan el riesgo de tener un coágulo sanguíneo.  German tenido varicela (varicela zóster).    ¿Cuáles son los signos o los síntomas?  El dolor de pecho puede provocar las siguientes sensaciones:    Ardor u hormigueo en la superficie o en lo profundo del pecho.  Dolor opresivo, continuo o constrictivo.  Dolor vago o intenso que empeora al moverse, toser o inhalar profundamente.  Dolor que también se siente en la espalda, el noah, el hombro o el brazo, o dolor que se extiende a cualquiera de estas zonas.    El dolor de pecho puede aparecer y desaparecer, o rafal puede ser jose.    ¿Cómo se diagnostica?  Quizás se necesiten análisis de laboratorio u otros estudios para encontrar la causa del dolor. El médico puede indicarle que se cameron chris prueba llamada ECG (electrocardiograma). El electrocardiograma registra los patrones de los latidos cardíacos en el momento en que se realiza el estudio. También pueden hacerle otros estudios, por ejemplo:    Ecocardiograma transtorácico (ETT). En kyara estudio, se usan ondas sonoras para crear chris imagen de las estructuras cardíacas y evaluar cómo circula la yahaira por el corazón.  Ecocardiografía transesofágica (ETE). [place] es un estudio de diagnóstico por imágenes más avanzado mediante el cual se aakash imágenes del interior del cuerpo. Le permite al médico jessica el corazón con mayor detalle.  Monitoreo cardíaco. Permite que el médico controle la frecuencia y el ritmo cardíacos en tiempo real.  Monitor Holter. Es un dispositivo portátil que registra los latidos del corazón y puede ayudar a diagnosticar los latidos cardíacos anómalos. Le permite al médico registrar la actividad cardíaca riccardo varios días, si es necesario.  Pruebas de esfuerzo. Estas pueden realizarse riccardo el ejercicio o mediante la administración de un medicamento que acelera los latidos del corazón.  Análisis de yahaira.  Otros estudios de diagnóstico por imágenes.    ¿Cómo se trata?  El tratamiento depende de la causa del dolor de pecho. El tratamiento puede incluir lo siguiente:    Medicamentos. Estos pueden incluir, entre otros, los siguientes:  Inhibidores de la acidez estomacal.  Antiinflamatorios.  Analgésicos para las enfermedades inflamatorias.  Antibióticos, si hay chris infección.  Medicamentos para disolver los coágulos sanguíneos.  Medicamentos para tratar la enfermedad arterial coronaria (EAC).  Tratamiento complementario para las enfermedades que no requieren la prashanth de medicamentos. Algunas opciones de kyara tipo de tratamiento incluyen las siguientes:  Hacer reposo.  Aplicar compresas frías o calientes en las zonas lesionadas.  Limitar las actividades hasta que disminuya el dolor.    Siga estas indicaciones en medeiros casa:  Medicamentos    Si le recetaron un antibiótico, tómelo martinez se lo haya indicado el médico. No deje de kevin el antibiótico aunque comience a sentirse mejor.  [place] los medicamentos de venta olivia y los recetados solamente martinez se lo haya indicado el médico.    Estilo de shelley    No consuma ningún producto que contenga nicotina o tabaco, martinez cigarrillos y cigarrillos electrónicos. Si necesita ayuda para dejar de fumar, consulte al médico.  No hali alcohol.  Cameron cambios en medeiros estilo de shelley martinez se lo haya indicado el médico. Estos pueden incluir, entre otros, los siguientes:   Practicar actividad física con regularidad. Pida al médico que le sugiera algunas actividades que mary seguras para usted.  Consumir chris dieta cardiosaludable. Un nutricionista matriculado puede ayudarlo a hacer elecciones saludables.  Mantener un peso saludable.  Controlar la diabetes, si es necesario.  Disminuir el nivel de estrés; por ejemplo, con yoga o técnicas de relajación.    Instrucciones generales    Evite las actividades que le causen dolor de pecho.  Si la causa del dolor de pecho es la acidez estomacal, levante (eleve) la cabecera de la cama aproximadamente 6 pulgadas (15 cm) colocando bloques debajo de las patas. Usar más almohadas para dormir no es efectivo para aliviar la acidez estomacal porque solo modificaría la posición de la scott.  Concurra a todas las visitas de seguimiento martinez se lo haya indicado el médico. [place] es importante. [place] incluye otros estudios si el dolor de pecho no desaparece.    Comuníquese con un médico si:  El dolor de pecho no desaparece.  Tiene chris erupción cutánea con ampollas en el pecho.  Tiene fiebre.  Tiene escalofríos.    Solicite ayuda de inmediato si:  El dolor en el pecho es más intenso.  Tiene tos que empeora o tose con yahaira.  Siente un dolor intenso en el abdomen.  Siente debilidad intensa.  Se desmaya.  Tiene chris molestia repentina e inexplicable en el pecho.  Tiene molestias repentinas e inexplicables en los brazos, la espalda, el noah o la mandíbula.  Le falta el aire en cualquier momento.  Comienza a sudar de manera repentina o la piel se le humedece.  Siente náuseas o vomita.  Se siente repentinamente mareado o se desmaya.  Siente que el corazón comienza a latir rápidamente o que se saltea latidos.    Estos síntomas pueden representar un problema grave que constituye chris emergencia. No espere hasta que los síntomas desaparezcan. Solicite atención médica de inmediato. Comuníquese con el servicio de emergencias de medeiros localidad (911 en los [place]). No conduzca por nehemias propios medios hasta el hospital.    NOTAS ADICIONALES E INSTRUCCIONES    Usted se va contra el consejo medico, nos preocupo medeiros estado medico  Por favor tenga seguimiento con medeiros doctor primario y cardiologo lo mas pronto posible.  Regrese a urgencias por cualquier preocupacion medica.

## 2020-11-18 NOTE — ED PROVIDER NOTE - CLINICAL SUMMARY MEDICAL DECISION MAKING FREE TEXT BOX
39 yo male hx of anxiety on medication unsure presenting with chest pain and sob, afebrile nontoxic appearing. covid in april, was supposed to go to cardiologist today but came to ER

## 2020-11-18 NOTE — ED PROVIDER NOTE - PROGRESS NOTE DETAILS
POLI CALL: pt states that his pain is slightly better post medication, has apt today withc ardiologist at 530pm. advised on strict return precautions including and not limited to severe chest pain shortness of breath. advised to rest, ibu or tylenol for pain control. pt verbalizes understanding and able to teach back,

## 2020-11-18 NOTE — ED ADULT NURSE NOTE - CAS ELECT INFOMATION PROVIDED
DC instructions/DC instructions provided to the patient by POLI Metha. Patient with no further questions for the RN regarding dc instructions. VSS. Ambulatory.

## 2020-11-18 NOTE — ED PROVIDER NOTE - CLINICAL SUMMARY MEDICAL DECISION MAKING FREE TEXT BOX
41y/o M with PMHx of Anxiety presents to the ED c/o non radiating CP which began 1 night ago. Assoc. sx of SOB.  Pt presented to ED this morning for sx, was instructed to f/u with Cardiologist Katelynn who referred pt back for admission to ED for Cardiac CT and Echo.  Pt states that he was told it would be done immediately in ER, does not want to stay and is willing to wait to hear from Dr. Pace. 39y/o M with PMHx of Anxiety presents to the ED c/o non radiating CP which began 1 night ago. Assoc. sx of SOB.  Spoke to Dr. Hernandez states that plan was for pt to stay overnight, get Echo and Cardiac CT tomorrow. No one will be reading them today. Discussed with pt who states he has arrangements that need to be made to stay, leaving AMA but will return or f/u with Dr. Pace. Dr. Hernandez made aware and will f/u with Dr. Pace. 41y/o M with PMHx of Anxiety presents to the ED c/o non radiating CP which began 1 night ago. Assoc. sx of SOB.  Spoke to Dr. Hernandez states that plan was for pt to stay overnight, get Echo and Cardiac CT tomorrow. No one will be reading them today. Discussed with pt who states he has arrangements that need to be made to stay, leaving AMA but will return or f/u with Dr. Megan Lugo. Dr. Hernandez made aware and will f/u with Dr. Megan Lugo

## 2020-11-18 NOTE — ED ADULT TRIAGE NOTE - CHIEF COMPLAINT QUOTE
"I have chest pain and my blood pressure is high and I have chest pains." "I have chest pain and my blood pressure is high."

## 2020-11-18 NOTE — ED PROVIDER NOTE - OBJECTIVE STATEMENT
41y/o M with PMHx of Anxiety presents to the ED c/o non radiating CP which began 1 night ago. Assoc. sx of SOB. Pt states that CP is a heavy sensation and is constant. Pt presented to ED this morning for sx, was instructed to f/u with Cardiologist Dr. Megan Myers who referred pt back for admission to ED for Cardiac CT and Echo. Pt denies fevers/chills, ha, loc, focal neuro deficits, palp, cough, abd pain/n/v/d, urinary symptoms, recent travel and sick contacts. +family history of cardiac disease

## 2020-11-18 NOTE — ED ADULT NURSE NOTE - OBJECTIVE STATEMENT
Pt with hx of anxiety on meds he is unsure the name, c/o mid chest "tightness and heaviness" that he states started last night. Pt is scheduled to see his Cardiologist later today but states he was nervous so came to ER. Pt noted to be anxious. Placed on CM and . MD at bedside during RN assessment.

## 2020-11-18 NOTE — ED PROVIDER NOTE - OBJECTIVE STATEMENT
41y/o M with PMHx of Anxiety presents to the ED c/o non radiating CP which began 1 night ago. Assoc. sx of SOB. Pt states that CP is a heavy sensation and is constant. Pt presented to ED this morning for sx, was instructed to f/u with Cardiologist Katelynn who referred pt back for admission to ED for Cardiac CT and Echo. Pt states that he was told it would be done immediately in ER, does not want to stay and is willing to wait to hear from Dr. Pace. Pt denies fevers/chills, ha, loc, focal neuro deficits, palp, cough, abd pain/n/v/d, urinary symptoms, recent travel and sick contacts. 41y/o M with PMHx of Anxiety presents to the ED c/o non radiating CP which began 1 night ago. Assoc. sx of SOB. Pt states that CP is a heavy sensation and is constant. Pt presented to ED this morning for sx, was instructed to f/u with Cardiologist Dr. Megna Myers who referred pt back for admission to ED for Cardiac CT and Echo. Pt states that he was told it would be done immediately in ER, does not want to stay and is willing to wait to hear from Dr. Myers. Pt denies fevers/chills, ha, loc, focal neuro deficits, palp, cough, abd pain/n/v/d, urinary symptoms, recent travel and sick contacts. +family history of cardiac disease

## 2020-11-18 NOTE — ED PROVIDER NOTE - OBJECTIVE STATEMENT
41 yo male hx of kidney stones, anxiety  hx of anxiety attacks on medication but unsure med,   presenting to the ER chest pain since last night heaviness constant no radiation on the left side " on the inside", + sob   states that he has had this many times in the past, similar to other anxiety attacks where he gets a cold sweat and then would need to go out and get some air. states that this heaviness feels similar to other anxiety attacks in the past but his blood pressure was high. last night states that when he took a deep breath he feels that chest heaviness. states that he has a chronic cough, even prior to covid   former smoker in april,   hx of covid april 2020   + family hx of CAD, father MI age 56yo   had apt with cardiologist today unsure where, as per mom 1* cardiology (Juan)

## 2020-11-19 VITALS
DIASTOLIC BLOOD PRESSURE: 78 MMHG | HEART RATE: 62 BPM | TEMPERATURE: 98 F | OXYGEN SATURATION: 98 % | SYSTOLIC BLOOD PRESSURE: 120 MMHG | RESPIRATION RATE: 18 BRPM

## 2020-11-19 PROCEDURE — 85379 FIBRIN DEGRADATION QUANT: CPT

## 2020-11-19 PROCEDURE — 99284 EMERGENCY DEPT VISIT MOD MDM: CPT | Mod: 25

## 2020-11-19 PROCEDURE — 75574 CT ANGIO HRT W/3D IMAGE: CPT | Mod: 26

## 2020-11-19 PROCEDURE — T1013: CPT

## 2020-11-19 PROCEDURE — 93005 ELECTROCARDIOGRAM TRACING: CPT

## 2020-11-19 PROCEDURE — 83690 ASSAY OF LIPASE: CPT

## 2020-11-19 PROCEDURE — 75574 CT ANGIO HRT W/3D IMAGE: CPT

## 2020-11-19 PROCEDURE — 93306 TTE W/DOPPLER COMPLETE: CPT

## 2020-11-19 PROCEDURE — 85025 COMPLETE CBC W/AUTO DIFF WBC: CPT

## 2020-11-19 PROCEDURE — 85730 THROMBOPLASTIN TIME PARTIAL: CPT

## 2020-11-19 PROCEDURE — 83735 ASSAY OF MAGNESIUM: CPT

## 2020-11-19 PROCEDURE — 85610 PROTHROMBIN TIME: CPT

## 2020-11-19 PROCEDURE — 83880 ASSAY OF NATRIURETIC PEPTIDE: CPT

## 2020-11-19 PROCEDURE — 36415 COLL VENOUS BLD VENIPUNCTURE: CPT

## 2020-11-19 PROCEDURE — 84484 ASSAY OF TROPONIN QUANT: CPT

## 2020-11-19 PROCEDURE — 80053 COMPREHEN METABOLIC PANEL: CPT

## 2020-11-19 PROCEDURE — 96374 THER/PROPH/DIAG INJ IV PUSH: CPT | Mod: XU

## 2020-11-19 PROCEDURE — 71045 X-RAY EXAM CHEST 1 VIEW: CPT

## 2020-11-19 RX ORDER — METOPROLOL TARTRATE 50 MG
50 TABLET ORAL ONCE
Refills: 0 | Status: DISCONTINUED | OUTPATIENT
Start: 2020-11-19 | End: 2020-11-23

## 2020-11-19 RX ORDER — ALPRAZOLAM 0.25 MG
0.25 TABLET ORAL ONCE
Refills: 0 | Status: DISCONTINUED | OUTPATIENT
Start: 2020-11-19 | End: 2020-11-19

## 2020-11-19 RX ORDER — METOPROLOL TARTRATE 50 MG
100 TABLET ORAL ONCE
Refills: 0 | Status: COMPLETED | OUTPATIENT
Start: 2020-11-19 | End: 2020-11-19

## 2020-11-19 RX ORDER — DIPHENHYDRAMINE HCL 50 MG
50 CAPSULE ORAL ONCE
Refills: 0 | Status: DISCONTINUED | OUTPATIENT
Start: 2020-11-19 | End: 2020-11-19

## 2020-11-19 RX ADMIN — Medication 0.25 MILLIGRAM(S): at 00:45

## 2020-11-19 RX ADMIN — Medication 100 MILLIGRAM(S): at 08:02

## 2020-11-19 RX ADMIN — Medication 1 MILLIGRAM(S): at 12:21

## 2020-11-19 RX ADMIN — Medication 0.25 MILLIGRAM(S): at 10:02

## 2020-11-19 NOTE — ED CDU PROVIDER DISPOSITION NOTE - NSFOLLOWUPINSTRUCTIONS_ED_ALL_ED_FT
Please follow up with your primary care MD    Please follow up with your cardiologist    Return if symptoms worsen or persist

## 2020-11-19 NOTE — ED CDU PROVIDER DISPOSITION NOTE - PATIENT PORTAL LINK FT
You can access the FollowMyHealth Patient Portal offered by [facility] by registering at the following website: [email or web address]. By joining [employer]’s FollowMyHealth portal, you will also be able to view your health information using other applications (apps) compatible with our system.

## 2020-11-19 NOTE — CONSULT NOTE ADULT - SUBJECTIVE AND OBJECTIVE BOX
Patient is a 40y old  Male who presents with a chief complaint of       PAST MEDICAL & SURGICAL HISTORY:  Kidney stones    No significant past surgical history        Summary of admission HPI:                PREVIOUS DIAGNOSTIC TESTING:      ECHO  FINDINGS:    STRESS  FINDINGS:    CATHETERIZATION  FINDINGS:    ELECTROPHYSIOLOGY STUDY  FINDINGS:    CAROTID ULTRASOUND:  FINDINGS    VENOUS DUPLEX SCAN:  FINDINGS:    CHEST CT PULMONARY ANGIO with IV Contrast:  FINDINGS:  MEDICATIONS  (STANDING):    MEDICATIONS  (PRN):  metoprolol tartrate 50 milliGRAM(s) Oral once PRN 45 min prior to CTA HR 65-75      FAMILY HISTORY:  Family history of coronary artery disease (Father)        SOCIAL HISTORY:    CIGARETTES:    ALCOHOL:    REVIEW OF SYSTEMS:    CONSTITUTIONAL: No fever, weight loss, chills, shakes, or fatigue  EYES: No eye pain, visual disturbances, or discharge  ENMT:  No difficulty hearing, tinnitus, vertigo; No sinus or throat pain  NECK: No pain or stiffness  BREASTS: No pain, masses, or nipple discharge  RESPIRATORY: No cough, wheezing, hemoptysis, or shortness of breath  CARDIOVASCULAR: No chest pain, dyspnea, palpitations, dizziness, syncope, paroxysmal nocturnal dyspnea, orthopnea, or arm or leg swelling  GASTROINTESTINAL: No abdominal  or epigastric pain, nausea, vomiting, hematemesis, diarrhea, constipation, melena or bright red blood.  GENITOURINARY: No dysuria, nocturia, hematuria, or urinary incontinence  NEUROLOGICAL: No headaches, memory loss, slurred speech, limb weakness, loss of strength, numbness, or tremors  SKIN: No itching, burning, rashes, or lesions   LYMPH NODES: No enlarged glands  ENDOCRINE: No heat or cold intolerance, or hair loss  MUSCULOSKELETAL: No joint pain or swelling, muscle, back, or extremity pain  PSYCHIATRIC: No depression, anxiety, or difficulty sleeping  HEME/LYMPH: No easy bruising or bleeding gums  ALLERY AND IMMUNOLOGIC: No hives or rash.      Vital Signs Last 24 Hrs  T(C): 36.6 (19 Nov 2020 11:08), Max: 37.4 (18 Nov 2020 18:03)  T(F): 97.9 (19 Nov 2020 11:08), Max: 99.4 (18 Nov 2020 18:03)  HR: 59 (19 Nov 2020 11:12) (59 - 85)  BP: 108/69 (19 Nov 2020 11:08) (108/69 - 151/91)  BP(mean): --  RR: 18 (19 Nov 2020 11:08) (18 - 19)  SpO2: 98% (19 Nov 2020 11:08) (97% - 100%)    PHYSICAL EXAM:    GENERAL: In no apparent distress, well nourished, and hydrated.  HEAD:  Atraumatic, Normocephalic  EYES: EOMI, PERRLA, conjunctiva and sclera clear  ENMT: No tonsillar erythema, exudates, or enlargement; Moist mucous membranes, Good dentition, No lesions  NECK: Supple and normal thyroid.  No JVD or carotid bruit.  Carotid pulse is 2+ bilaterally.  HEART: Regular rate and rhythm; No murmurs, rubs, or gallops.  PULMONARY: Clear to auscultation and perfusion.  No rales, wheezing, or rhonchi bilaterally.  ABDOMEN: Soft, Nontender, Nondistended; Bowel sounds present  EXTREMITIES:  2+ Peripheral Pulses, No clubbing, cyanosis, or edema  LYMPH: No lymphadenopathy noted  NEUROLOGICAL: Grossly nonfocal          INTERPRETATION OF TELEMETRY:    ECG:    I&O's Detail      LABS:                        15.1   12.51 )-----------( 325      ( 18 Nov 2020 23:02 )             44.2     11-18    139  |  103  |  18.0  ----------------------------<  125<H>  3.7   |  23.0  |  0.86    Ca    9.3      18 Nov 2020 23:02  Mg     2.2     11-18    TPro  7.3  /  Alb  4.4  /  TBili  0.4  /  DBili  x   /  AST  35  /  ALT  39  /  AlkPhos  75  11-18    CARDIAC MARKERS ( 18 Nov 2020 23:02 )  x     / <0.01 ng/mL / x     / x     / x      CARDIAC MARKERS ( 18 Nov 2020 08:05 )  x     / <0.01 ng/mL / x     / x     / x          PT/INR - ( 18 Nov 2020 23:02 )   PT: 11.4 sec;   INR: 0.98 ratio         PTT - ( 18 Nov 2020 23:02 )  PTT:26.2 sec    BNPSerum Pro-Brain Natriuretic Peptide: 8 pg/mL (11-18 @ 23:02)    I&O's Detail    Daily Height in cm: 165.1 (18 Nov 2020 22:22)    Daily     RADIOLOGY & ADDITIONAL STUDIES: Patient is a 40y old  Male who presents with a chief complaint of chest pain       PAST MEDICAL & SURGICAL HISTORY:  Kidney stones    No significant past surgical history        Summary of admission HPI:    41 yo M w/ family h/o father reportedly dying from MI @54 yo who is referred to ER for chest pain. Patient reports chest pain that is sometimes associated with exertion but also sometimes associated with respiration.               MEDICATIONS  (PRN):  metoprolol tartrate 50 milliGRAM(s) Oral once PRN 45 min prior to CTA HR 65-75      FAMILY HISTORY:  Family history of coronary artery disease (Father)      REVIEW OF SYSTEMS:    CONSTITUTIONAL: No fever, weight loss, chills, shakes, or fatigue  EYES: No eye pain, visual disturbances, or discharge  ENMT:  No difficulty hearing, tinnitus, vertigo; No sinus or throat pain  NECK: No pain or stiffness  LYMPH NODES: No enlarged glands  ENDOCRINE: No heat or cold intolerance, or hair loss  MUSCULOSKELETAL: No joint pain or swelling, muscle, back, or extremity pain  PSYCHIATRIC: No depression, anxiety, or difficulty sleeping  HEME/LYMPH: No easy bruising or bleeding gums  ALLERY AND IMMUNOLOGIC: No hives or rash.      Vital Signs Last 24 Hrs  T(C): 36.6 (19 Nov 2020 11:08), Max: 37.4 (18 Nov 2020 18:03)  T(F): 97.9 (19 Nov 2020 11:08), Max: 99.4 (18 Nov 2020 18:03)  HR: 59 (19 Nov 2020 11:12) (59 - 85)  BP: 108/69 (19 Nov 2020 11:08) (108/69 - 151/91)  BP(mean): --  RR: 18 (19 Nov 2020 11:08) (18 - 19)  SpO2: 98% (19 Nov 2020 11:08) (97% - 100%)    PHYSICAL EXAM:    GENERAL: In no apparent distress, well nourished, and hydrated.  HEAD:  Atraumatic, Normocephalic  EYES: EOMI, PERRLA, conjunctiva and sclera clear  ENMT: No tonsillar erythema, exudates, or enlargement; Moist mucous membranes, Good dentition, No lesions  NECK: Supple and normal thyroid.  No JVD or carotid bruit.  Carotid pulse is 2+ bilaterally.  HEART: Regular rate and rhythm; No murmurs, rubs, or gallops.  PULMONARY: Clear to auscultation and perfusion.  No rales, wheezing, or rhonchi bilaterally.  ABDOMEN: Soft, Nontender, Nondistended; Bowel sounds present  EXTREMITIES:  2+ Peripheral Pulses, No clubbing, cyanosis, or edema        INTERPRETATION OF TELEMETRY: sinus bradycardia     ECG:    I&O's Detail      LABS:                        15.1   12.51 )-----------( 325      ( 18 Nov 2020 23:02 )             44.2     11-18    139  |  103  |  18.0  ----------------------------<  125<H>  3.7   |  23.0  |  0.86    Ca    9.3      18 Nov 2020 23:02  Mg     2.2     11-18    TPro  7.3  /  Alb  4.4  /  TBili  0.4  /  DBili  x   /  AST  35  /  ALT  39  /  AlkPhos  75  11-18    CARDIAC MARKERS ( 18 Nov 2020 23:02 )  x     / <0.01 ng/mL / x     / x     / x      CARDIAC MARKERS ( 18 Nov 2020 08:05 )  x     / <0.01 ng/mL / x     / x     / x          PT/INR - ( 18 Nov 2020 23:02 )   PT: 11.4 sec;   INR: 0.98 ratio         PTT - ( 18 Nov 2020 23:02 )  PTT:26.2 sec    BNPSerum Pro-Brain Natriuretic Peptide: 8 pg/mL (11-18 @ 23:02)    I&O's Detail    Daily Height in cm: 165.1 (18 Nov 2020 22:22)    Daily     RADIOLOGY & ADDITIONAL STUDIES:

## 2020-11-19 NOTE — ED CDU PROVIDER INITIAL DAY NOTE - CONSTITUTIONAL, MLM
normal... Well appearing, awake, alert, oriented to person, place, time/situation and in no apparent distress. Anxious appearing.

## 2020-11-19 NOTE — ED CDU PROVIDER INITIAL DAY NOTE - MEDICAL DECISION MAKING DETAILS
39 yo male PMHx of anxiety presents to the ED c/o non radiating CP which began 1 night ago associated with SOB. Patient evaluated by own cardiologist who recommended patient present to ED for CTA and TTE for which patient placed in CDU.

## 2020-11-19 NOTE — CONSULT NOTE ADULT - ASSESSMENT
41 yo M w/ h/o family h/o father reportedly dying from MI @56 yo who is referred to ER for chest pain. Patient reports chest pain that is sometimes associated with exertion but also sometimes associated with respiration.     1. Chest Pain  2. HTN       PLAN:    1. F/u coronary CTA  2. F/u TTE, JUAN  3. F/u d-dimer  4. If evaluation is negative, then patient can be d/c from a cardiac perspective

## 2020-11-19 NOTE — ED ADULT NURSE REASSESSMENT NOTE - NS ED NURSE REASSESS COMMENT FT1
Patient with HR 60-61, pt states "won't be able to tolerate CT scan as he feels very claustrophobic", [employer] [facility] notified, xanax po given as per orders prior CT scan. Patient resting in comfort.
received pt alert and orientedx3. in stretcher in no apparent signs of distress. Pt remains stable on monitor, no chest pain, sating well on room air , PIV site WNL. Pt status unchanged, refer to flowsheet and chart, pt ID band in place, pt safety maintained, pt hemodynamically stable, updated on plan of care. Awaiting on CTA , NPo  . Call light provided, fall safety reinforced. Will continue to monitor
Assumed care of the patient at 0730. Verbal report received form Shana MUÑOZ Obs. Patient A&Ox4. Patient appears anxious, Denies palpitations, CP or dizziness. NSR on CM. PIV patent, blood return+. CT scan called, pt to be taken within an hour as per tech, Lopressor given as per orders. Patient also pending TTE test. Patient remains NPO pending CTA testing. Ambulatory with steady gait. Patient in understanding of plan of care. Patient with no further questions for the RN. Resting in comfort. Call bell within reach and encouraged to use when assistance needed. Will continue to monitor.

## 2020-11-19 NOTE — ED CDU PROVIDER DISPOSITION NOTE - ATTENDING CONTRIBUTION TO CARE
I, Stella Campbell MD have personally performed a face to face diagnostic evaluation on this patient.  I have reviewed the ACP note and agree with the history, exam, and plan of care, except as noted.     Exam:  Head: atraumatic, normacephalic  Face: atraumatic, no crepitus no orbiral/maxillary/mandibular ttp  throat: uvula midline no exudates  eyes: perrla eomi  heart: rrr s1s2  lungs: ctab  abd: soft, nt nd +bs no rebound/guarding no cva ttp  skin: warm  LE: no swelling, no calf ttp  back: no midline cervical/thoracic/lumbar ttp    --no complaints at this time; Denies f/c/n/v/cp/sob/palpitations/ cough/rash/headache/dizziness/abd.pain/d/c/dysuria/hematuria  feels better ct coronatires wnl; normal echo trop neg d-dimer neg seen by cardiology ok to dc with outpatient follow up

## 2020-11-19 NOTE — ED CDU PROVIDER INITIAL DAY NOTE - OBJECTIVE STATEMENT
39y/o M with PMHx of Anxiety presents to the ED c/o non radiating CP which began 1 night ago. Assoc. sx of SOB. Pt states that CP is a heavy sensation and is constant. Pt presented to ED this morning for sx, was instructed to f/u with Cardiologist Dr. Megan Myers who referred pt back for admission to ED for Cardiac CT and Echo, patient initially then left AMA because was told testing would be done immediately. Pt denies fevers/chills, ha, loc, focal neuro deficits, palp, cough, abd pain/n/v/d, urinary symptoms, recent travel and sick contacts. +family history of cardiac disease 39y/o M with PMHx of Anxiety presents to the ED c/o non radiating CP which began 1 night ago. Worsened with deep inspiration, assoc. sx of SOB. Pt states that CP is a heavy sensation and is constant. Pt presented to ED this morning for sx, was instructed to f/u with Cardiologist Dr. Megan Myers who referred pt back for admission to ED for Cardiac CT and Echo, patient initially then left AMA because was told testing would be done immediately. Pt denies fevers/chills, ha, loc, focal neuro deficits, palp, cough, abd pain/n/v/d, urinary symptoms, recent travel and sick contacts. +family history of cardiac disease

## 2020-11-19 NOTE — ED CDU PROVIDER DISPOSITION NOTE - CLINICAL COURSE
41y/o M with PMHx of Anxiety presents to the ED c/o non radiating CP which began 1 night ago. Worsened with deep inspiration, assoc. sx of SOB. Pt states that CP is a heavy sensation and is constant. Pt presented to ED this morning for sx, was instructed to f/u with Cardiologist Dr. Megan Myers who referred pt back for admission to ED for Cardiac CT and Echo, patient initially then left AMA because was told testing would be done immediately. Pt denies fevers/chills, ha, loc, focal neuro deficits, palp, cough, abd pain/n/v/d, urinary symptoms, recent travel and sick contacts. +family history of cardiac disease.  Patient placed in observation for serial troponin negative, EKGs that were non-ischemic, seen by cardiology recommended echo and cardiac CT, unremarkable, stable for outpatient follow up.

## 2020-11-19 NOTE — ED CDU PROVIDER DISPOSITION NOTE - CARE PROVIDER_API CALL
Gregg Hernandez  CARDIOVASCULAR DISEASE  [address] [place] [address]  Phone: [phone]  Fax: [phone]  Follow Up Time:

## 2020-11-19 NOTE — ED CDU PROVIDER INITIAL DAY NOTE - ATTENDING CONTRIBUTION TO CARE
39y/o M with PMHx of Anxiety presents to the ED c/o non radiating CP which began 1 night ago. Assoc. sx of SOB. Pt came back after making arrangements at home for his kids, ekg wnl, trop neg, sent in for cardiac ct, echo and acs eval, keep in obs

## 2020-12-10 NOTE — ED ADULT NURSE NOTE - CAS EDP DISCH TYPE
Called [facility] for nurse to nurse. Talked with Teagan Soto. All information given, no questions at this time. Work

## 2021-03-10 NOTE — ED PROVIDER NOTE - NSPTACCESSSVCSAPPT_ED_ALL_ED
sp vancomycin, cefepime, and clindamycin in the ED  CXR left pleural effusion v atelectasis and consolidation cannot be ruled out  UE with few bacteria and small LE and negative nitrite  Blood cultures x2 sent  Sacral decubitus ulcer evaluated by general surgery unlikely to be source clean on debridement.   CT abd pelvis with endometrial thickening suspicious for metastatic disease no obvious abscess  May need PICC exchanged has been in place since admission to [facility] 1/2021  -empiric vanc 1g q12h and vanc level pre 4th dose Day 1 (3/9- )  -cefepime 2g q8h Day 1 (3/8 PM- )  -metronidazole Day 1 (3/9- ) PCP for Routine Care

## 2021-09-30 ENCOUNTER — EMERGENCY (EMERGENCY)
Facility: HOSPITAL | Age: 41
LOS: 1 days | Discharge: HOME | End: 2021-09-30
Attending: EMERGENCY MEDICINE
Payer: MEDICAID

## 2021-09-30 VITALS
HEIGHT: 65 IN | RESPIRATION RATE: 18 BRPM | HEART RATE: 77 BPM | OXYGEN SATURATION: 98 % | DIASTOLIC BLOOD PRESSURE: 90 MMHG | TEMPERATURE: 99 F | SYSTOLIC BLOOD PRESSURE: 129 MMHG | WEIGHT: 169.98 LBS

## 2021-09-30 VITALS
DIASTOLIC BLOOD PRESSURE: 80 MMHG | RESPIRATION RATE: 18 BRPM | TEMPERATURE: 98 F | OXYGEN SATURATION: 100 % | HEART RATE: 70 BPM | SYSTOLIC BLOOD PRESSURE: 127 MMHG

## 2021-09-30 LAB
ALBUMIN SERPL ELPH-MCNC: 4.7 G/DL — SIGNIFICANT CHANGE UP (ref 3.3–5.2)
ALP SERPL-CCNC: 85 U/L — SIGNIFICANT CHANGE UP (ref 40–120)
ALT FLD-CCNC: 41 U/L — HIGH
ANION GAP SERPL CALC-SCNC: 12 MMOL/L — SIGNIFICANT CHANGE UP (ref 5–17)
AST SERPL-CCNC: 30 U/L — SIGNIFICANT CHANGE UP
BASOPHILS # BLD AUTO: 0.04 K/UL — SIGNIFICANT CHANGE UP (ref 0–0.2)
BASOPHILS NFR BLD AUTO: 0.5 % — SIGNIFICANT CHANGE UP (ref 0–2)
BILIRUB SERPL-MCNC: <0.2 MG/DL — LOW (ref 0.4–2)
BUN SERPL-MCNC: 17.3 MG/DL — SIGNIFICANT CHANGE UP (ref 8–20)
CALCIUM SERPL-MCNC: 9.4 MG/DL — SIGNIFICANT CHANGE UP (ref 8.6–10.2)
CHLORIDE SERPL-SCNC: 101 MMOL/L — SIGNIFICANT CHANGE UP (ref 98–107)
CO2 SERPL-SCNC: 24 MMOL/L — SIGNIFICANT CHANGE UP (ref 22–29)
CREAT SERPL-MCNC: 0.92 MG/DL — SIGNIFICANT CHANGE UP (ref 0.5–1.3)
EOSINOPHIL # BLD AUTO: 0.29 K/UL — SIGNIFICANT CHANGE UP (ref 0–0.5)
EOSINOPHIL NFR BLD AUTO: 3.6 % — SIGNIFICANT CHANGE UP (ref 0–6)
GLUCOSE SERPL-MCNC: 94 MG/DL — SIGNIFICANT CHANGE UP (ref 70–99)
HCT VFR BLD CALC: 43.4 % — SIGNIFICANT CHANGE UP (ref 39–50)
HGB BLD-MCNC: 15 G/DL — SIGNIFICANT CHANGE UP (ref 13–17)
IMM GRANULOCYTES NFR BLD AUTO: 0.4 % — SIGNIFICANT CHANGE UP (ref 0–1.5)
LYMPHOCYTES # BLD AUTO: 2.89 K/UL — SIGNIFICANT CHANGE UP (ref 1–3.3)
LYMPHOCYTES # BLD AUTO: 35.6 % — SIGNIFICANT CHANGE UP (ref 13–44)
MCHC RBC-ENTMCNC: 31.3 PG — SIGNIFICANT CHANGE UP (ref 27–34)
MCHC RBC-ENTMCNC: 34.6 GM/DL — SIGNIFICANT CHANGE UP (ref 32–36)
MCV RBC AUTO: 90.4 FL — SIGNIFICANT CHANGE UP (ref 80–100)
MONOCYTES # BLD AUTO: 0.57 K/UL — SIGNIFICANT CHANGE UP (ref 0–0.9)
MONOCYTES NFR BLD AUTO: 7 % — SIGNIFICANT CHANGE UP (ref 2–14)
NEUTROPHILS # BLD AUTO: 4.29 K/UL — SIGNIFICANT CHANGE UP (ref 1.8–7.4)
NEUTROPHILS NFR BLD AUTO: 52.9 % — SIGNIFICANT CHANGE UP (ref 43–77)
PLATELET # BLD AUTO: 281 K/UL — SIGNIFICANT CHANGE UP (ref 150–400)
POTASSIUM SERPL-MCNC: 4.2 MMOL/L — SIGNIFICANT CHANGE UP (ref 3.5–5.3)
POTASSIUM SERPL-SCNC: 4.2 MMOL/L — SIGNIFICANT CHANGE UP (ref 3.5–5.3)
PROT SERPL-MCNC: 7.6 G/DL — SIGNIFICANT CHANGE UP (ref 6.6–8.7)
RBC # BLD: 4.8 M/UL — SIGNIFICANT CHANGE UP (ref 4.2–5.8)
RBC # FLD: 12 % — SIGNIFICANT CHANGE UP (ref 10.3–14.5)
SODIUM SERPL-SCNC: 137 MMOL/L — SIGNIFICANT CHANGE UP (ref 135–145)
TROPONIN T SERPL-MCNC: <0.01 NG/ML — SIGNIFICANT CHANGE UP (ref 0–0.06)
WBC # BLD: 8.11 K/UL — SIGNIFICANT CHANGE UP (ref 3.8–10.5)
WBC # FLD AUTO: 8.11 K/UL — SIGNIFICANT CHANGE UP (ref 3.8–10.5)

## 2021-09-30 PROCEDURE — 99284 EMERGENCY DEPT VISIT MOD MDM: CPT

## 2021-09-30 PROCEDURE — 96375 TX/PRO/DX INJ NEW DRUG ADDON: CPT

## 2021-09-30 PROCEDURE — 71045 X-RAY EXAM CHEST 1 VIEW: CPT | Mod: 26

## 2021-09-30 PROCEDURE — 85025 COMPLETE CBC W/AUTO DIFF WBC: CPT

## 2021-09-30 PROCEDURE — 99284 EMERGENCY DEPT VISIT MOD MDM: CPT | Mod: 25

## 2021-09-30 PROCEDURE — 93010 ELECTROCARDIOGRAM REPORT: CPT

## 2021-09-30 PROCEDURE — 71045 X-RAY EXAM CHEST 1 VIEW: CPT

## 2021-09-30 PROCEDURE — 36415 COLL VENOUS BLD VENIPUNCTURE: CPT

## 2021-09-30 PROCEDURE — 80053 COMPREHEN METABOLIC PANEL: CPT

## 2021-09-30 PROCEDURE — 84484 ASSAY OF TROPONIN QUANT: CPT

## 2021-09-30 PROCEDURE — 93005 ELECTROCARDIOGRAM TRACING: CPT

## 2021-09-30 PROCEDURE — 96374 THER/PROPH/DIAG INJ IV PUSH: CPT

## 2021-09-30 RX ORDER — METOCLOPRAMIDE HCL 10 MG
10 TABLET ORAL ONCE
Refills: 0 | Status: COMPLETED | OUTPATIENT
Start: 2021-09-30 | End: 2021-09-30

## 2021-09-30 RX ORDER — KETOROLAC TROMETHAMINE 30 MG/ML
30 SYRINGE (ML) INJECTION ONCE
Refills: 0 | Status: DISCONTINUED | OUTPATIENT
Start: 2021-09-30 | End: 2021-09-30

## 2021-09-30 RX ADMIN — Medication 10 MILLIGRAM(S): at 21:36

## 2021-09-30 RX ADMIN — Medication 30 MILLIGRAM(S): at 21:37

## 2021-09-30 NOTE — ED ADULT NURSE NOTE - OBJECTIVE STATEMENT
Pt. c/o CP since this morning left sided radiating to left shoulder, w/palpitations/sweating, currently pain 5/10, describes pain as deep, needle like pain.  Also states HA.  Denies N/V/fever/SOB.  Hx. of HTN.

## 2021-09-30 NOTE — ED PROVIDER NOTE - CLINICAL SUMMARY MEDICAL DECISION MAKING FREE TEXT BOX
40 yo male with chest pain. Negative cardiac studies last November. Pain has been constant for >12 hours. Will order labs, cxr. EKG normal sinus.

## 2021-09-30 NOTE — ED PROVIDER NOTE - PATIENT PORTAL LINK FT
You can access the FollowMyHealth Patient Portal offered by [facility] by registering at the following website: [email or web address]. By joining [employer]’s FollowMyHealth portal, you will also be able to view your health information using other applications (apps) compatible with our system.

## 2021-09-30 NOTE — ED ADULT TRIAGE NOTE - CHIEF COMPLAINT QUOTE
Pt reporting 'deep left sided chest pain radiating down L arm' since this morning. Pt reporting pain has improved through out the day, currently reported as 5/10. Pt also reporting headache. Pt with PMH HTN.

## 2021-09-30 NOTE — ED PROVIDER NOTE - ATTENDING CONTRIBUTION TO CARE
Luis E: I performed a face to face evaluation of this patient and performed a full history and physical examination on the patient.  I agree with the resident's history, physical examination, and plan of the patient unless otherwise noted. My brief assessment is as follows: hx htn c/o non radiating non exertional cp starting this morning. neg ctca and echo last year, follows with [facility]. no hx dvt/pe, no leg pain/swelling, no pleuritic pain, no sob. States father had mi's in past. no other complaints. wants to leave within 2 hours. non toxic, nad, ctab, rrr, adb benign, neuro intact. no swellin gb/l LE. ekg non ischemic, labs, trop, cxr. likely close f/u cards.

## 2021-09-30 NOTE — ED PROVIDER NOTE - OBJECTIVE STATEMENT
40 yo male with hx of HTN presents to the ED for chest pain. Pain began this morning, constant, located in the left side of the chest, non-radiating, sharp in nature. No exacerbating or alleviating factors. Denies SOB, cough, fever, diaphoresis, N/V. Had negative CTCA and echo less than one year ago. See Dr. Hernandez from [facility].

## 2022-08-29 ENCOUNTER — APPOINTMENT (OUTPATIENT)
Dept: UROLOGY | Facility: CLINIC | Age: 42
End: 2022-08-29